# Patient Record
Sex: FEMALE | Race: AMERICAN INDIAN OR ALASKA NATIVE
[De-identification: names, ages, dates, MRNs, and addresses within clinical notes are randomized per-mention and may not be internally consistent; named-entity substitution may affect disease eponyms.]

---

## 2020-10-15 ENCOUNTER — HOSPITAL ENCOUNTER (EMERGENCY)
Dept: HOSPITAL 5 - ED | Age: 40
Discharge: HOME | End: 2020-10-15
Payer: COMMERCIAL

## 2020-10-15 VITALS — DIASTOLIC BLOOD PRESSURE: 87 MMHG | SYSTOLIC BLOOD PRESSURE: 143 MMHG
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
## 2020-10-15 DIAGNOSIS — Z79.899: ICD-10-CM
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
## 2020-10-15 DIAGNOSIS — I10: ICD-10-CM

## 2020-10-15 DIAGNOSIS — S80.811A: ICD-10-CM

## 2020-10-15 DIAGNOSIS — Z79.1: ICD-10-CM

## 2020-10-15 DIAGNOSIS — S80.812A: Primary | ICD-10-CM
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
## 2020-10-15 PROCEDURE — 90471 IMMUNIZATION ADMIN: CPT

## 2020-10-15 PROCEDURE — 99282 EMERGENCY DEPT VISIT SF MDM: CPT

## 2020-10-15 PROCEDURE — 90715 TDAP VACCINE 7 YRS/> IM: CPT

## 2020-10-15 NOTE — EMERGENCY DEPARTMENT REPORT
- General


Chief complaint: Skin/Abscess/Foreign Body


Stated complaint: LEFT LEG INJURY/TECHNA SHOT


Time Seen by Provider: 10/15/20 20:58


Source: patient


Mode of arrival: Ambulatory


Limitations: No Limitations





- History of Present Illness


Initial comments: 


Patient is a 39-year-old female who presents with bilateral thigh abrasions 

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  There 

were bilateral 3 cm abrasions no bleeding at this time no open wound.  Patient 

concerned as last tetanus was greater than 15 years ago.  Patient is alert 

oriented ambulatory x3 states abrasion site pain at 4/10 exacerbated by 

palpation. Symptoms are relieved by nothing tried. 





MD complaint: other (bilat thigh abrasion )





- Related Data


                                  Previous Rx's











 Medication  Instructions  Recorded  Last Taken  Type


 


Ibuprofen [Motrin 800 MG tab] 800 mg PO Q8HR PRN #30 tablet 10/15/20 Unknown Rx


 


Mupirocin [Bactroban 2% OINT] 1 applic TP TID #1 tube 10/15/20 Unknown Rx











                                    Allergies











Allergy/AdvReac Type Severity Reaction Status Date / Time


 


No Known Allergies Allergy   Unverified 10/15/20 16:40














Abscess Boil HPI





- HPI


Chief Complaint: Skin/Abscess/Foreign Body


Stated Complaint: LEFT LEG INJURY/TECHNA SHOT


Time Seen by Provider: 10/15/20 20:58


Home Medications: 


                                  Previous Rx's











 Medication  Instructions  Recorded  Last Taken  Type


 


Ibuprofen [Motrin 800 MG tab] 800 mg PO Q8HR PRN #30 tablet 10/15/20 Unknown Rx


 


Mupirocin [Bactroban 2% OINT] 1 applic TP TID #1 tube 10/15/20 Unknown Rx











Allergies/Adverse Reactions: 


                                    Allergies











Allergy/AdvReac Type Severity Reaction Status Date / Time


 


No Known Allergies Allergy   Unverified 10/15/20 16:40














ED Review of Systems


ROS: 


Stated complaint: LEFT LEG INJURY/TECHNA SHOT


Other details as noted in HPI





Constitutional: denies: chills, fever


Eyes: denies: eye pain, eye discharge, vision change


ENT: denies: ear pain, throat pain


Respiratory: denies: cough, shortness of breath, wheezing


Cardiovascular: denies: chest pain, palpitations


Endocrine: no symptoms reported


Gastrointestinal: denies: abdominal pain, nausea, diarrhea


Genitourinary: denies: urgency, dysuria, discharge


Musculoskeletal: denies: back pain, joint swelling, arthralgia


Skin: other (Abrasion)


Neurological: denies: headache, weakness, paresthesias


Psychiatric: denies: anxiety, depression


Hematological/Lymphatic: denies: easy bleeding, easy bruising





ED Past Medical Hx





- Past Medical History


Hx Hypertension: Yes





- Surgical History


Past Surgical History?: No





- Social History


Smoking Status: Never Smoker





- Medications


Home Medications: 


                                Home Medications











 Medication  Instructions  Recorded  Confirmed  Last Taken  Type


 


Ibuprofen [Motrin 800 MG tab] 800 mg PO Q8HR PRN #30 tablet 10/15/20  Unknown Rx


 


Mupirocin [Bactroban 2% OINT] 1 applic TP TID #1 tube 10/15/20  Unknown Rx














ED Physical Exam





- General


Limitations: No Limitations


General appearance: alert, in no apparent distress





- Head


Head exam: Present: atraumatic, normocephalic





- Eye


Eye exam: Present: normal appearance





- ENT


ENT exam: Present: mucous membranes moist





- Neck


Neck exam: Present: normal inspection





- Respiratory


Respiratory exam: Present: normal lung sounds bilaterally.  Absent: respiratory 

distress





- Cardiovascular


Cardiovascular Exam: Present: regular rate, normal rhythm, normal heart sounds. 

Absent: systolic murmur, diastolic murmur, rubs, gallop





- GI/Abdominal


GI/Abdominal exam: Present: soft, normal bowel sounds.  Absent: distended, 

tenderness





- Rectal


Rectal exam: Present: deferred





- Extremities Exam


Extremities exam: Present: full ROM, normal capillary refill, other (bilat 

anterior thigh abrasions )





- Back Exam


Back exam: Present: normal inspection, full ROM.  Absent: tenderness





- Neurological Exam


Neurological exam: Present: alert, oriented X3, CN II-XII intact, normal gait





- Psychiatric


Psychiatric exam: Present: normal affect, normal mood





- Skin


Skin exam: Present: warm, dry, intact, normal color, erythema, abrasion.  Absent

: rash





ED Course





                                   Vital Signs











  10/15/20





  16:42


 


Temperature 98.2 F


 


Pulse Rate 89


 


Respiratory 18





Rate 


 


Blood Pressure 143/87


 


O2 Sat by Pulse 96





Oximetry 














ED Medical Decision Making





- Medical Decision Making


These are bilateral thigh abrasions 3 cm, patient given tetanus booster, will DC

to home with mupirocin ointment, NSAIDs as needed pain patient given wound care 

instructions.  Patient verbalized agreement and understanding with same.  

Patient will follow-up with PCP in 2 to 3 days.  Patient given instructions on 

signs and symptoms of infection.  Patient is currently alert oriented x3 amatory

with steady gait with no acute distress pain is relieved to 1/10 at this time.





Critical care attestation.: 


If time is entered above; I have spent that time in minutes in the direct care 

of this critically ill patient, excluding procedure time.








ED Disposition


Clinical Impression: 


Abrasion of leg


Qualifiers:


 Encounter type: initial encounter Laterality: unspecified laterality Qualified 

Code(s): S80.819A - Abrasion, unspecified lower leg, initial encounter





Disposition: DC-01 TO HOME OR SELFCARE


Is pt being admited?: No


Does the pt Need Aspirin: No


Condition: Stable


Instructions:  Abrasion (ED), Acute Wound Care (ED)


Prescriptions: 


Mupirocin [Bactroban 2% OINT] 1 applic TP TID #1 tube


Ibuprofen [Motrin 800 MG tab] 800 mg PO Q8HR PRN #30 tablet


 PRN Reason: pain


Referrals: 


MARY PALACIOS MD [Staff Physician] - 3-5 Days


Forms:  Work/School Release Form(ED)


Time of Disposition: 21:35